# Patient Record
(demographics unavailable — no encounter records)

---

## 2018-08-02 NOTE — RADIOLOGY REPORT (SQ)
EXAM DESCRIPTION:  U/S OB LIMITED



COMPLETED DATE/TIME:  8/2/2018 2:00 pm



REASON FOR STUDY:  cervical length to rule out pretrerm labor.



COMPARISON:  None.



TECHNIQUE:  Limited transabdominal grayscale ultrasound for evaluation of specific requested obstetri
spencer parameters.



LIMITATIONS:  None.



FINDINGS:  CERVICAL LENGTH: 3.9 cm   Closed.

DANNA: Largest pocket 3.3 cm.

FHR: 137 beats per minute.

PRESENTATION: Cephalic.

OTHER: No other significant findings.



IMPRESSION:  LIMITED OBSTETRICAL ULTRASOUND WITH MEASURED PARAMETERS DELINEATED ABOVE.

Trimester of pregnancy: Third trimester - 28 weeks to delivery.



TECHNICAL DOCUMENTATION:  JOB ID:  [id number]

 2011 [employer]- All Rights Reserved



Reading location - IP/workstation name: [email or web address]

## 2018-08-27 NOTE — NON STRESS TEST REPORT
=================================================================

Non Stress Test

=================================================================

Datetime Report Generated by CPN: 08/27/2018 00:33

   

   

=================================================================

DEMOGRAPHIC

=================================================================

   

EGA NST:  35.5

   

=================================================================

INDICATION

=================================================================

   

Indication for Study:  Ordered by Provider

   

=================================================================

MONITORING

=================================================================

   

Monitor Explained:  Monitor Explained; Test Explained; Patient

   Verbalized Understanding

Time on Monitor:  08/26/2018 22:45

Time off Monitor:  08/27/2018 00:01

NST Duration:  76

   

=================================================================

NST INTERVENTIONS

=================================================================

   

NST Interventions:  PO Hydration

Physician Notified NST:  Dr. Flynn

   

=================================================================

BABY A

=================================================================

   

Fetal Movement :  Present

Contraction Frequency :  none

FHR Baseline :  120

Accelerations :  15X15

Decelerations :  None

Variability :  Moderate 6-25bpm

NST Review:  Meets Criteria for Reactive NST

NST Review and Verified By :  BBillie Vanegas RN

NST Results:  Reactive

   

=================================================================

NST REPORT

=================================================================

   

Report Trigger:  Send Report

## 2018-08-28 NOTE — NON STRESS TEST REPORT
=================================================================

Non Stress Test

=================================================================

Datetime Report Generated by CPN: 08/28/2018 20:40

   

   

=================================================================

DEMOGRAPHIC

=================================================================

   

EGA NST:  35.6

   

=================================================================

INDICATION

=================================================================

   

Indication for Study:  Ordered by Provider

   

=================================================================

URINE RESULTS

=================================================================

   

Urine Protein, NST:  Negative

Urine Ketones - NST:  Negative

Urine Glucose - NST:  Negative

Urine Blood - NST:  Negative

   

=================================================================

MONITORING

=================================================================

   

Time on Monitor:  08/28/2018 18:39

Time off Monitor:  08/28/2018 20:23

NST Duration:  104

   

=================================================================

NST INTERVENTIONS

=================================================================

   

NST Interventions:  PO Hydration

Physician Notified NST:  Flynn

BABY A:  [id number]

   

=================================================================

BABY A

=================================================================

   

Fetal Movement :  Present

Contraction Frequency :  irregular

FHR Baseline :  125

Accelerations :  Prolonged

Decelerations :  None

Variability :  Moderate 6-25bpm

NST Review:  Meets Criteria for Reactive NST

NST Review and Verified By :  PRASANNA Henriquez RN

NST Results:  Reactive

   

=================================================================

NST REPORT

=================================================================

   

Report Trigger:  Send Report

## 2018-08-31 NOTE — NON STRESS TEST REPORT
=================================================================

Non Stress Test

=================================================================

Datetime Report Generated by CPN: 08/31/2018 22:56

   

   

=================================================================

DEMOGRAPHIC

=================================================================

   

EGA NST:  36.2

   

=================================================================

INDICATION

=================================================================

   

Indication for Study:  Ordered by Provider

Indication for Study (NST) Other:  labor check

   

=================================================================

MONITORING

=================================================================

   

Monitor Explained:  Monitor Explained; Test Explained; Patient

   Verbalized Understanding

Time on Monitor:  08/31/2018 20:45

Time off Monitor:  08/31/2018 21:47

NST Duration:  62

   

=================================================================

NST INTERVENTIONS

=================================================================

   

NST Interventions:  PO Hydration

Physician Notified NST:  Smith

BABY A:  [id number]

   

=================================================================

BABY A

=================================================================

   

Contraction Frequency :  none

FHR Baseline :  135

Accelerations :  15X15

Decelerations :  None

Variability :  Moderate 6-25bpm

NST Review:  Meets Criteria for Reactive NST

NST Review and Verified By :  charbel hernandez

NST Results:  Reactive

   

=================================================================

NST REPORT

=================================================================

   

Report Trigger:  Send Report

## 2020-07-10 NOTE — OPERATIVE REPORT
Operative Report


DATE OF SURGERY: 06/11/20


PREOPERATIVE DIAGNOSIS: Dysmenorrhea menorrhagia


POSTOPERATIVE DIAGNOSIS: Same


OPERATION: Robotic assisted hysterectomy bilateral salpingectomy converted to a 

vaginal hysterectomy


SURGEON: JARET HUMPHRIES


ANESTHESIA: GA


TISSUE REMOVED OR ALTERED: Uterus and tubes


COMPLICATIONS: 





Dropped left pedicle


ESTIMATED BLOOD LOSS: Approximately thousand cc


PROCEDURE: 





Patient is placed in a dorsal lithotomy patient prepped draped sterile fashion. 

Speculum placed cervix visualized and grasped with single-tooth tenaculum 

sounded to depth of 8 cm.  The uterine manipulator was then placed per protocol.

 The speculum was removed and attention turned to the abdomen.  From a local 

incision was made trochars introduced with visualization of the pelvis.  A small

corpus luteal cyst on the right ovary other ovary appeared to be normal and no 

other overt abnormalities noted.  Second puncture was made lateral to the 

midline on the right and a 5 trocar was reduced third lateral on the left and a 

5 then reduce an accessory port above the supra iliac  crest on the right.  

Robot was then docked in usual fashion.  The monopolar and bipolar cautery the 

right fallopian tube was identified and divided along the mesosalpinx broad 

ligament divided down to just above the ascending branch of the uterine artery 

on the right.  This procedure was begun on the left.  Care had perforated the 

uterus and the ring could not be identified and it was decided to convert to a 

LAVH.  Strings were removed and the robot was undocked.  Speculum placed in the 

cervix visualized and grasped with a Connor thyroid clamp after removing the V 

care.  Posterior cul-de-sacs in a sharp dissection posterior parietal peritoneum

was exposed to cuff with 2-0 Vicryl.  The cervix was surgically circumscribed 

and the anterior prior to peritoneum was entered with sharp dissection.  A 

pedicle on the left and was noted to be bleeding but could not be visualized 

adequately.  It was then clamped and divided and this was continued to above 

incisions were encountered and uterus and tubes were removed.  Vessel on the le

ft was identified and sutured with 2-0 Vicryl hemostasis was noted.  Vaginal 

cuff was then closed with interrupted sutures of 2-0 Vicryl.  States was noted. 

Concerned that abdomen where the head was reinflated and that visualization of 

the pelvis and no bleeding was noted.  It was irrigated with normal saline and 

irrigated.  Again hemostasis was noted.  The edges were removed and then 

deflated and trocar sleeves removed.  Umbilical and accessory port incisions 

were closed using 0 Vicryl for the fascia and 4 0 subcu.  Patient was taken to 

recovery in good condition